# Patient Record
Sex: FEMALE
[De-identification: names, ages, dates, MRNs, and addresses within clinical notes are randomized per-mention and may not be internally consistent; named-entity substitution may affect disease eponyms.]

---

## 2017-04-25 ENCOUNTER — APPOINTMENT (OUTPATIENT)
Dept: CARDIOLOGY | Facility: CLINIC | Age: 77
End: 2017-04-25

## 2017-12-04 ENCOUNTER — MEDICATION RENEWAL (OUTPATIENT)
Age: 77
End: 2017-12-04

## 2018-03-02 ENCOUNTER — MEDICATION RENEWAL (OUTPATIENT)
Age: 78
End: 2018-03-02

## 2018-03-06 ENCOUNTER — CLINICAL ADVICE (OUTPATIENT)
Age: 78
End: 2018-03-06

## 2018-04-24 ENCOUNTER — APPOINTMENT (OUTPATIENT)
Dept: CARDIOLOGY | Facility: CLINIC | Age: 78
End: 2018-04-24

## 2018-05-14 ENCOUNTER — RECORD ABSTRACTING (OUTPATIENT)
Age: 78
End: 2018-05-14

## 2018-05-15 ENCOUNTER — APPOINTMENT (OUTPATIENT)
Dept: CARDIOLOGY | Facility: CLINIC | Age: 78
End: 2018-05-15
Payer: MEDICARE

## 2018-05-15 VITALS
DIASTOLIC BLOOD PRESSURE: 84 MMHG | WEIGHT: 168 LBS | HEIGHT: 66 IN | OXYGEN SATURATION: 97 % | SYSTOLIC BLOOD PRESSURE: 116 MMHG | BODY MASS INDEX: 27 KG/M2 | HEART RATE: 58 BPM

## 2018-05-15 DIAGNOSIS — Z83.3 FAMILY HISTORY OF DIABETES MELLITUS: ICD-10-CM

## 2018-05-15 DIAGNOSIS — Z84.89 FAMILY HISTORY OF OTHER SPECIFIED CONDITIONS: ICD-10-CM

## 2018-05-15 DIAGNOSIS — Z84.1 FAMILY HISTORY OF DISORDERS OF KIDNEY AND URETER: ICD-10-CM

## 2018-05-15 DIAGNOSIS — Z80.1 FAMILY HISTORY OF MALIGNANT NEOPLASM OF TRACHEA, BRONCHUS AND LUNG: ICD-10-CM

## 2018-05-15 DIAGNOSIS — Z86.79 PERSONAL HISTORY OF OTHER DISEASES OF THE CIRCULATORY SYSTEM: ICD-10-CM

## 2018-05-15 DIAGNOSIS — Z78.9 OTHER SPECIFIED HEALTH STATUS: ICD-10-CM

## 2018-05-15 PROCEDURE — 93000 ELECTROCARDIOGRAM COMPLETE: CPT

## 2018-05-15 PROCEDURE — 99214 OFFICE O/P EST MOD 30 MIN: CPT

## 2018-12-04 ENCOUNTER — APPOINTMENT (OUTPATIENT)
Dept: CARDIOLOGY | Facility: CLINIC | Age: 78
End: 2018-12-04
Payer: MEDICARE

## 2018-12-04 VITALS
SYSTOLIC BLOOD PRESSURE: 130 MMHG | BODY MASS INDEX: 26.2 KG/M2 | HEART RATE: 58 BPM | OXYGEN SATURATION: 96 % | HEIGHT: 66 IN | WEIGHT: 163 LBS | DIASTOLIC BLOOD PRESSURE: 76 MMHG

## 2018-12-04 PROCEDURE — 99214 OFFICE O/P EST MOD 30 MIN: CPT

## 2018-12-04 NOTE — ASSESSMENT
[FreeTextEntry1] : - Hypertension.  Continue to keep blood pressure log and this was reminded again. Nonpharmacologic ways of reducing blood pressure again discussed.\par \par - Hypercholesteremia.  She is again declining statins. The implication of calculated 10 yr Cardiovascular risk was discussed. Risk and benefits of statins was discussed. Dietary changes were again discussed in detail.\par \par - Continue anticoagulant.\par \par - She is requesting renewal of  her  Toprol.\par \par - Patient is fairly active. She swims regularly.  No dyspnea on exertion. Previous echocardiogram in 2016 had moderate diastolic dysfunction But no reported pulmonary hypertension. Followup echocardiogram in the future.\par \par Sections of this transcription were entered using Voice Recognition Software. There may be an error with phonetically similar sounding words. Please call me if there are any questions re the content.\par \par \par Sincerely,\par Wilbur Carty MD, FACC, [place] \par

## 2018-12-04 NOTE — HISTORY OF PRESENT ILLNESS
[FreeTextEntry1] : Rose is a pleasant 77-year-old female with   paroxysmal atrial fibrillation - on beta blockers and anticoagulation with good ventricular rate control and Asymptomatic for it.\par \par She has hypertension for which she takes lisinopril and beta blockers.  Her blood pressure today is controlled.  \par \par No chest pains, No palpitations or dizziness. No TIA.  Older Stress echogram was negative for ischemia. \par \par Calculated 10 year cardiovascular risk >20%. They still refused statins. On recent labs, LDL was 119.\par

## 2018-12-04 NOTE — PHYSICAL EXAM
[General Appearance - Well Developed] : well developed [Normal Appearance] : normal appearance [Well Groomed] : well groomed [General Appearance - Well Nourished] : well nourished [No Deformities] : no deformities [General Appearance - In No Acute Distress] : no acute distress [Normal Conjunctiva] : the conjunctiva exhibited no abnormalities [Eyelids - No Xanthelasma] : the eyelids demonstrated no xanthelasmas [Normal Oral Mucosa] : normal oral mucosa [No Oral Pallor] : no oral pallor [No Oral Cyanosis] : no oral cyanosis [Respiration, Rhythm And Depth] : normal respiratory rhythm and effort [Exaggerated Use Of Accessory Muscles For Inspiration] : no accessory muscle use [Auscultation Breath Sounds / Voice Sounds] : lungs were clear to auscultation bilaterally [Heart Rate And Rhythm] : heart rate and rhythm were normal [Heart Sounds] : normal S1 and S2 [Murmurs] : no murmurs present [Abdomen Soft] : soft [Abdomen Tenderness] : non-tender [Abnormal Walk] : normal gait [Gait - Sufficient For Exercise Testing] : the gait was sufficient for exercise testing [Nail Clubbing] : no clubbing of the fingernails [Cyanosis, Localized] : no localized cyanosis [FreeTextEntry1] : Superficial varicose veins on the ankles. [Skin Color & Pigmentation] : normal skin color and pigmentation [Skin Turgor] : normal skin turgor [] : no rash [Oriented To Time, Place, And Person] : oriented to person, place, and time [Affect] : the affect was normal [Mood] : the mood was normal [No Anxiety] : not feeling anxious

## 2019-07-09 ENCOUNTER — NON-APPOINTMENT (OUTPATIENT)
Age: 79
End: 2019-07-09

## 2019-07-09 ENCOUNTER — APPOINTMENT (OUTPATIENT)
Dept: CARDIOLOGY | Facility: CLINIC | Age: 79
End: 2019-07-09
Payer: MEDICARE

## 2019-07-09 VITALS
SYSTOLIC BLOOD PRESSURE: 150 MMHG | BODY MASS INDEX: 26.84 KG/M2 | OXYGEN SATURATION: 96 % | HEIGHT: 66 IN | HEART RATE: 64 BPM | DIASTOLIC BLOOD PRESSURE: 80 MMHG | WEIGHT: 167 LBS

## 2019-07-09 PROCEDURE — 93000 ELECTROCARDIOGRAM COMPLETE: CPT

## 2019-07-09 PROCEDURE — 99214 OFFICE O/P EST MOD 30 MIN: CPT

## 2019-07-09 NOTE — PHYSICAL EXAM
[General Appearance - Well Developed] : well developed [Normal Appearance] : normal appearance [General Appearance - Well Nourished] : well nourished [Well Groomed] : well groomed [No Deformities] : no deformities [General Appearance - In No Acute Distress] : no acute distress [Normal Conjunctiva] : the conjunctiva exhibited no abnormalities [Normal Oral Mucosa] : normal oral mucosa [Eyelids - No Xanthelasma] : the eyelids demonstrated no xanthelasmas [No Oral Pallor] : no oral pallor [No Oral Cyanosis] : no oral cyanosis [Exaggerated Use Of Accessory Muscles For Inspiration] : no accessory muscle use [Respiration, Rhythm And Depth] : normal respiratory rhythm and effort [Heart Rate And Rhythm] : heart rate and rhythm were normal [Auscultation Breath Sounds / Voice Sounds] : lungs were clear to auscultation bilaterally [Murmurs] : no murmurs present [Heart Sounds] : normal S1 and S2 [Abdomen Soft] : soft [Abnormal Walk] : normal gait [Abdomen Tenderness] : non-tender [Gait - Sufficient For Exercise Testing] : the gait was sufficient for exercise testing [Nail Clubbing] : no clubbing of the fingernails [Cyanosis, Localized] : no localized cyanosis [Skin Turgor] : normal skin turgor [Skin Color & Pigmentation] : normal skin color and pigmentation [FreeTextEntry1] : Superficial varicose veins on the ankles. [Oriented To Time, Place, And Person] : oriented to person, place, and time [] : no rash [Mood] : the mood was normal [Affect] : the affect was normal [No Anxiety] : not feeling anxious

## 2019-07-09 NOTE — HISTORY OF PRESENT ILLNESS
[FreeTextEntry1] : Rose is a pleasant 78-year-old female with   paroxysmal atrial fibrillation - on beta blockers and anticoagulation with good ventricular rate control and Asymptomatic for it.\par \par She has hypertension for which she takes lisinopril and beta blockers.  Her blood pressure today is controlled.  \par \par No chest pains, No palpitations or dizziness. No TIA.  Older Stress echogram was negative for ischemia. \par \par Calculated 10 year cardiovascular risk >20%. They still refused statins. On recent labs, LDL was 144.\par

## 2019-07-09 NOTE — ASSESSMENT
[FreeTextEntry1] : - Hypertension.  Continue to keep blood pressure log and this was reminded again. Nonpharmacologic ways of reducing blood pressure again discussed. Blood pressure is high today.  She is refusing increase in medications\par \par - Hypercholesteremia.  She is again declining statins. The implication of calculated 10 yr Cardiovascular risk was discussed. Risk and benefits of statins was discussed. Dietary changes were again discussed in detail. Her LDL has increased in January. The patient is aware of this\par \par - Continue anticoagulant.\par \par - Patient is fairly active. She swims regularly.  No dyspnea on exertion. Previous echocardiogram in 2016 had moderate diastolic dysfunction But no reported pulmonary hypertension. Followup echocardiogram in the future. Patient wants to defer this.\par \par She also refused to get weight today\par \par \par Sincerely,\par Wilbur Carty MD, FACC, [place] \par

## 2019-11-14 ENCOUNTER — MEDICATION RENEWAL (OUTPATIENT)
Age: 79
End: 2019-11-14

## 2020-02-25 ENCOUNTER — APPOINTMENT (OUTPATIENT)
Dept: CARDIOLOGY | Facility: CLINIC | Age: 80
End: 2020-02-25
Payer: MEDICARE

## 2020-02-25 VITALS
SYSTOLIC BLOOD PRESSURE: 176 MMHG | HEART RATE: 57 BPM | BODY MASS INDEX: 26.68 KG/M2 | HEIGHT: 66 IN | DIASTOLIC BLOOD PRESSURE: 84 MMHG | WEIGHT: 166 LBS | OXYGEN SATURATION: 97 %

## 2020-02-25 PROCEDURE — 99214 OFFICE O/P EST MOD 30 MIN: CPT

## 2020-02-25 NOTE — ASSESSMENT
[FreeTextEntry1] : - Hypertension.  Continue to keep blood pressure log.  She has not brought this even though she has been reminded several times.  Blood pressure is uncontrolled and labile she is refusing increase in medications\par \par - Hypercholesteremia.  She is again declining statins. The implication of calculated 10 yr Cardiovascular risk was discussed. Risk and benefits of statins was discussed. Dietary changes were again discussed in detail. Her LDL has increased in January. The patient is aware of this\par \par - Continue anticoagulant.\par \par - Patient is fairly active. She swims regularly.  No dyspnea on exertion. Previous echocardiogram in 2016 had moderate diastolic dysfunction Followup echocardiogram in the future. Patient wants to defer this.\par \par She also refused to get weight today\par \par ETT in future for blood pressure control as well as for CAD evaluation\par \par \par Sincerely,\par Wilbur Carty MD, FACC, FASE \par

## 2020-02-25 NOTE — PHYSICAL EXAM
[General Appearance - Well Developed] : well developed [Normal Appearance] : normal appearance [Well Groomed] : well groomed [General Appearance - Well Nourished] : well nourished [No Deformities] : no deformities [General Appearance - In No Acute Distress] : no acute distress [Normal Conjunctiva] : the conjunctiva exhibited no abnormalities [Eyelids - No Xanthelasma] : the eyelids demonstrated no xanthelasmas [No Oral Cyanosis] : no oral cyanosis [Normal Oral Mucosa] : normal oral mucosa [No Oral Pallor] : no oral pallor [Respiration, Rhythm And Depth] : normal respiratory rhythm and effort [Exaggerated Use Of Accessory Muscles For Inspiration] : no accessory muscle use [Auscultation Breath Sounds / Voice Sounds] : lungs were clear to auscultation bilaterally [Heart Sounds] : normal S1 and S2 [Heart Rate And Rhythm] : heart rate and rhythm were normal [Abdomen Soft] : soft [Murmurs] : no murmurs present [Abdomen Tenderness] : non-tender [Abnormal Walk] : normal gait [Gait - Sufficient For Exercise Testing] : the gait was sufficient for exercise testing [Cyanosis, Localized] : no localized cyanosis [Nail Clubbing] : no clubbing of the fingernails [FreeTextEntry1] : Superficial varicose veins on the ankles. [Skin Color & Pigmentation] : normal skin color and pigmentation [Skin Turgor] : normal skin turgor [] : no rash [Oriented To Time, Place, And Person] : oriented to person, place, and time [Affect] : the affect was normal [No Anxiety] : not feeling anxious [Mood] : the mood was normal

## 2020-02-25 NOTE — HISTORY OF PRESENT ILLNESS
[FreeTextEntry1] : Rose is a pleasant 79-year-old female with   paroxysmal atrial fibrillation - on beta blockers and anticoagulation with good rate control and Asymptomatic for it.\par \par She has hypertension for which she takes lisinopril and beta blockers.  Her blood pressure today is uncontrolled.  It has been labile in the past.  She does not want to increase her blood pressure medications.  She insists that the blood pressure is well controlled outside this office.  He is asymptomatic for hypertension\par \par No chest pains, No palpitations or dizziness. No TIA.  Older Stress echogram was negative for ischemia. \par \par Calculated 10 year cardiovascular risk >20%. They still refused statins. On previous labs, LDL was 144.  Has come down to 129\par

## 2020-09-08 ENCOUNTER — APPOINTMENT (OUTPATIENT)
Dept: CARDIOLOGY | Facility: CLINIC | Age: 80
End: 2020-09-08
Payer: MEDICARE

## 2020-09-08 PROCEDURE — 93306 TTE W/DOPPLER COMPLETE: CPT

## 2020-09-08 PROCEDURE — 93015 CV STRESS TEST SUPVJ I&R: CPT

## 2021-02-16 ENCOUNTER — APPOINTMENT (OUTPATIENT)
Dept: CARDIOLOGY | Facility: CLINIC | Age: 81
End: 2021-02-16
Payer: MEDICARE

## 2021-02-16 VITALS
OXYGEN SATURATION: 98 % | TEMPERATURE: 98.2 F | BODY MASS INDEX: 26.68 KG/M2 | DIASTOLIC BLOOD PRESSURE: 98 MMHG | HEART RATE: 56 BPM | HEIGHT: 66 IN | SYSTOLIC BLOOD PRESSURE: 200 MMHG | WEIGHT: 166 LBS

## 2021-02-16 PROCEDURE — 99214 OFFICE O/P EST MOD 30 MIN: CPT

## 2021-02-16 RX ORDER — LISINOPRIL 20 MG/1
20 TABLET ORAL DAILY
Qty: 90 | Refills: 3 | Status: DISCONTINUED | COMMUNITY
Start: 1900-01-01 | End: 2021-02-16

## 2021-02-16 NOTE — ASSESSMENT
[FreeTextEntry1] : - Hypertension.  Continue to keep blood pressure log.  She has not brought this even though she has been reminded several times.  Blood pressure is uncontrolled and labile.  Heart rate is low.  Keep beta-blockers as it is.  Change lisinopril to valsartan.  She will call next week with blood pressure reading.  She does not want to make another office visit.\par \par - Hypercholesteremia.  She is again declining statins. The implication of calculated 10 yr Cardiovascular risk was discussed. Risk and benefits of statins was discussed. Dietary changes were again discussed in detail. Her LDL has increased in January. The patient is aware of this.  I have reminded the patient to have fasting lipid profile with primary care.\par \par - Continue anticoagulant.  No   episodes of palpitations, TIA, dizziness\par \par - Patient is fairly active. She used to swim regularly.  This has stopped due to Covid situation.  No dyspnea on exertion. \par \par - ETT in September 2020 : Exercised 6: 07 minutes on Shadi protocol.  Peak blood pressure 198.  She reached 85% of target heart rate.  No ischemia or angina.\par \par -Echocardiogram September 2020: Normal LV function and wall motion. Mildly dilated left atrium.  Normal diastolic function and PASP.\par \par Follow-up in 6 months.  Follow-up earlier if her home blood pressure log shows elevated blood pressure readings.  The patient is agreeable to this plan.\par \par \par Sincerely,\par Wilbur Carty MD, FACC, [place] \par

## 2021-02-16 NOTE — HISTORY OF PRESENT ILLNESS
[FreeTextEntry1] : Rose is a pleasant 80-year-old female with   paroxysmal atrial fibrillation - on beta blockers and anticoagulation with good rate control and Asymptomatic for it.\par \par She has hypertension for which she takes lisinopril and beta blockers.  Her blood pressure today is uncontrolled.  It has been labile in the past.  Se is asymptomatic for hypertension.  Her pressure was also elevated with ETT.  She has finally agreed to increase her antihypertensive treatment\par \par No chest pains, No palpitations or dizziness. No TIA.  Older Stress echogram was negative for ischemia. \par \par Calculated 10 year cardiovascular risk >20%. They still refused statins. On previous labs, LDL was 144.  Has come down to 129\par

## 2021-08-31 ENCOUNTER — APPOINTMENT (OUTPATIENT)
Dept: CARDIOLOGY | Facility: CLINIC | Age: 81
End: 2021-08-31
Payer: MEDICARE

## 2021-08-31 VITALS
HEART RATE: 60 BPM | OXYGEN SATURATION: 97 % | DIASTOLIC BLOOD PRESSURE: 86 MMHG | WEIGHT: 166 LBS | SYSTOLIC BLOOD PRESSURE: 164 MMHG | BODY MASS INDEX: 26.68 KG/M2 | HEIGHT: 66 IN

## 2021-08-31 PROCEDURE — 93000 ELECTROCARDIOGRAM COMPLETE: CPT

## 2021-08-31 PROCEDURE — 99214 OFFICE O/P EST MOD 30 MIN: CPT

## 2021-08-31 RX ORDER — VALSARTAN 160 MG/1
160 TABLET, COATED ORAL DAILY
Qty: 90 | Refills: 0 | Status: DISCONTINUED | COMMUNITY
Start: 2021-02-16 | End: 2021-08-31

## 2021-08-31 NOTE — PHYSICAL EXAM
[General Appearance - Well Developed] : well developed [Normal Appearance] : normal appearance [Well Groomed] : well groomed [General Appearance - Well Nourished] : well nourished [No Deformities] : no deformities [General Appearance - In No Acute Distress] : no acute distress [Normal Conjunctiva] : the conjunctiva exhibited no abnormalities [Eyelids - No Xanthelasma] : the eyelids demonstrated no xanthelasmas [Normal Oral Mucosa] : normal oral mucosa [No Oral Pallor] : no oral pallor [No Oral Cyanosis] : no oral cyanosis [Respiration, Rhythm And Depth] : normal respiratory rhythm and effort [Exaggerated Use Of Accessory Muscles For Inspiration] : no accessory muscle use [Auscultation Breath Sounds / Voice Sounds] : lungs were clear to auscultation bilaterally [Heart Rate And Rhythm] : heart rate and rhythm were normal [Heart Sounds] : normal S1 and S2 [Murmurs] : no murmurs present [Abdomen Soft] : soft [Abdomen Tenderness] : non-tender [Abnormal Walk] : normal gait [Gait - Sufficient For Exercise Testing] : the gait was sufficient for exercise testing [Nail Clubbing] : no clubbing of the fingernails [FreeTextEntry1] : Superficial varicose veins on the ankles. [Cyanosis, Localized] : no localized cyanosis [Skin Color & Pigmentation] : normal skin color and pigmentation [Skin Turgor] : normal skin turgor [] : no rash [Oriented To Time, Place, And Person] : oriented to person, place, and time [Affect] : the affect was normal [Mood] : the mood was normal [No Anxiety] : not feeling anxious

## 2021-08-31 NOTE — ASSESSMENT
[FreeTextEntry1] : - Hypertension.  Continue to keep blood pressure log.  She has not brought this even though she has been reminded several times.  Blood pressure is uncontrolled and labile.  Beta-blocker dose was reduced in the past due to bradycardia.  Currently on valsartan and HCTZ.  Add amlodipine 2.5 p.o. daily.  Check BMP and magnesium.  Follow-up blood pressure in few weeks\par \par - Hypercholesteremia.  She is again declining statins. The implication of calculated 10 yr Cardiovascular risk was discussed. Risk and benefits of statins was discussed. Dietary changes were again discussed in detail. Her LDL had increased in January 2021. The patient is aware of this.  I have reminded the patient to have fasting lipid profile with primary care.\par \par - Continue anticoagulant.  No   episodes of palpitations, TIA, dizziness.  GI bleed.  No bleeding elsewhere.\par \par - Patient is fairly active.  He has restarted swimming.  No dyspnea on exertion. \par \par - ETT in September 2020 : Exercised only 6: 07 minutes on Shadi protocol.  Peak blood pressure 198.  She reached 85% of target heart rate.  No ischemia or angina.\par \par -Echocardiogram September 2020: Normal LV function and wall motion. Mildly dilated left atrium.  Normal diastolic function and PASP.\par \par \par \par \par Sincerely,\par Wilbur Carty MD, FACC, [place] \par

## 2021-09-21 ENCOUNTER — APPOINTMENT (OUTPATIENT)
Dept: CARDIOLOGY | Facility: CLINIC | Age: 81
End: 2021-09-21
Payer: MEDICARE

## 2021-09-21 VITALS — DIASTOLIC BLOOD PRESSURE: 86 MMHG | SYSTOLIC BLOOD PRESSURE: 168 MMHG

## 2021-09-21 VITALS
HEART RATE: 71 BPM | WEIGHT: 165 LBS | TEMPERATURE: 97.7 F | DIASTOLIC BLOOD PRESSURE: 80 MMHG | OXYGEN SATURATION: 96 % | SYSTOLIC BLOOD PRESSURE: 182 MMHG | HEIGHT: 66 IN | BODY MASS INDEX: 26.52 KG/M2

## 2021-09-21 PROCEDURE — 99213 OFFICE O/P EST LOW 20 MIN: CPT

## 2021-09-21 NOTE — ASSESSMENT
[FreeTextEntry1] : To review, Rose is a 80-year-old female with the following active issues.\par \par Hypertension: Not well controlled.  She did not start amlodipine as requested at her previous office visit.  Extensive discussion regarding need for blood pressure control.  Goal less than 130 systolic.  Patient verbalizes understanding.  She states she will start amlodipine but prefers to have her blood pressure checked at her PMDs office.  Requested note be sent to us to ensure normotension.\par \par PAF: Tolerating Xarelto.  No abnormal bleeding.  Stable H&H.  Prescription refilled.

## 2021-09-21 NOTE — REASON FOR VISIT
[Hypertension] : hypertension [FreeTextEntry1] : Rose is a very pleasant 80-year-old female that presents today for blood pressure reevaluation.  At last office visit, blood pressure was uncontrolled.  Amlodipine 2.5 mg was added to her regimen.  She presents today for follow-up.  Unfortunately she was not aware that she was supposed to start a new medication and did not pick it up.  Blood pressure my evaluation was 168/86 mmHg.  States that overall she feels well.  Denies any exertional symptoms to include chest pain or shortness of breath.  Denies headache, or FND.\par \par History of PAF.  She is requesting renewal of her Xarelto.  CBC shows normal hemoglobin and hematocrit.

## 2022-03-29 ENCOUNTER — APPOINTMENT (OUTPATIENT)
Dept: CARDIOLOGY | Facility: CLINIC | Age: 82
End: 2022-03-29
Payer: MEDICARE

## 2022-03-29 VITALS
TEMPERATURE: 97.1 F | HEIGHT: 66 IN | WEIGHT: 165 LBS | BODY MASS INDEX: 26.52 KG/M2 | RESPIRATION RATE: 14 BRPM | DIASTOLIC BLOOD PRESSURE: 76 MMHG | HEART RATE: 62 BPM | OXYGEN SATURATION: 100 % | SYSTOLIC BLOOD PRESSURE: 154 MMHG

## 2022-03-29 DIAGNOSIS — K21.9 GASTRO-ESOPHAGEAL REFLUX DISEASE W/OUT ESOPHAGITIS: ICD-10-CM

## 2022-03-29 DIAGNOSIS — I10 ESSENTIAL (PRIMARY) HYPERTENSION: ICD-10-CM

## 2022-03-29 DIAGNOSIS — E78.00 PURE HYPERCHOLESTEROLEMIA, UNSPECIFIED: ICD-10-CM

## 2022-03-29 DIAGNOSIS — I48.0 PAROXYSMAL ATRIAL FIBRILLATION: ICD-10-CM

## 2022-03-29 PROCEDURE — 99214 OFFICE O/P EST MOD 30 MIN: CPT

## 2022-03-29 NOTE — REASON FOR VISIT
[Hypertension] : hypertension [FreeTextEntry1] : Rose is a very pleasant 81-year-old female with hypertension.  At last office visit, blood pressure was uncontrolled.  Amlodipine 2.5 mg was added to her regimen.  States that overall she feels well.  Denies any exertional symptoms to include chest pain or shortness of breath.  Denies headache, or FND.  Blood pressure is elevated in the office today.  She is taking her medications regularly including amlodipine.\par \par History of PAF.  She is requesting renewal of her Xarelto.  CBC shows normal hemoglobin and hematocrit.

## 2022-03-29 NOTE — ASSESSMENT
[FreeTextEntry1] : To review, Rose is a 81-year-old female with the following active issues.\par \par Hypertension: Not well controlled.  She takes amlodipine 2.5 mg daily.  She is compliant with other blood pressure medications but did not take them this morning.  \par \par Extensive discussion regarding need for blood pressure control.  Goal less than 130 systolic.  Patient verbalizes understanding.  \par \par PAF: Tolerating Xarelto.  No abnormal bleeding.  Stable H&H.  Prescription refilled.\par \par Carotid atherosclerosis: Check carotid ultrasound also echocardiogram for possible hypertensive changes.  Keep blood pressure log at home.\par \par

## 2022-03-29 NOTE — PHYSICAL EXAM
[Well Developed] : well developed [Well Nourished] : well nourished [No Acute Distress] : no acute distress [Normal Conjunctiva] : normal conjunctiva [Normal] : normal venous pressure, no carotid bruit [No Carotid Bruit] : no carotid bruit [Normal S1, S2] : normal S1, S2 [No Murmur] : no murmur [No Rub] : no rub [No Gallop] : no gallop [Clear Lung Fields] : clear lung fields [Good Air Entry] : good air entry [No Respiratory Distress] : no respiratory distress  [Soft] : abdomen soft [Non Tender] : non-tender [No Masses/organomegaly] : no masses/organomegaly [Normal Bowel Sounds] : normal bowel sounds [Normal Gait] : normal gait [No Edema] : no edema [No Cyanosis] : no cyanosis [No Clubbing] : no clubbing [No Varicosities] : no varicosities [No Rash] : no rash [No Skin Lesions] : no skin lesions [Normal Speech] : normal speech [Alert and Oriented] : alert and oriented [Normal memory] : normal memory [de-identified] : Unequal carotid  stroke

## 2022-10-03 ENCOUNTER — APPOINTMENT (OUTPATIENT)
Dept: CARDIOLOGY | Facility: CLINIC | Age: 82
End: 2022-10-03

## 2022-10-03 PROCEDURE — 93306 TTE W/DOPPLER COMPLETE: CPT

## 2022-10-03 PROCEDURE — 93880 EXTRACRANIAL BILAT STUDY: CPT

## 2022-10-06 ENCOUNTER — NON-APPOINTMENT (OUTPATIENT)
Age: 82
End: 2022-10-06

## 2022-10-11 ENCOUNTER — APPOINTMENT (OUTPATIENT)
Dept: CARDIOLOGY | Facility: CLINIC | Age: 82
End: 2022-10-11

## 2023-08-18 ENCOUNTER — APPOINTMENT (OUTPATIENT)
Dept: CARDIOLOGY | Facility: CLINIC | Age: 83
End: 2023-08-18
Payer: MEDICARE

## 2023-08-18 VITALS
HEIGHT: 66 IN | OXYGEN SATURATION: 98 % | BODY MASS INDEX: 26.68 KG/M2 | SYSTOLIC BLOOD PRESSURE: 144 MMHG | HEART RATE: 74 BPM | WEIGHT: 166 LBS | DIASTOLIC BLOOD PRESSURE: 60 MMHG

## 2023-08-18 PROCEDURE — 93000 ELECTROCARDIOGRAM COMPLETE: CPT

## 2023-08-18 PROCEDURE — 99213 OFFICE O/P EST LOW 20 MIN: CPT

## 2023-08-18 RX ORDER — AMLODIPINE BESYLATE 5 MG/1
5 TABLET ORAL
Qty: 90 | Refills: 3 | Status: ACTIVE | COMMUNITY
Start: 2021-08-31 | End: 1900-01-01

## 2023-08-24 NOTE — DISCUSSION/SUMMARY
[FreeTextEntry1] : JOSSY CARSON is a 82 year old F who presents today Aug 22, 2023 with the above history and the following active issues: Hyperlipidemia Pt refuses to have blood repeated or to go on Statins. She will discuss it with her primary care MD.She will also follow up with her and have CBC drawn Discussed red flag symptoms, which would warrant sooner or emergent medical evaluation. Any questions and concerns were addressed and resolved.

## 2023-08-24 NOTE — HISTORY OF PRESENT ILLNESS
[FreeTextEntry1] : Rose is a very pleasant 81-year-old female with hypertension. She has not followed up in over a year. Is here to get refills. Her BP at home is 115//64. She feels well. She is taking all her medications. Her last lipid panel 10/22 Chol 226  HDL 75. She states that she had blood work done at her PMD At last office visit, blood pressure was uncontrolled.  Amlodipine 2.5 mg was added to her regimen.  States that overall she feels well.  Denies any exertional symptoms to include chest pain or shortness of breath.  Denies headache, or FND.  Blood pressure is elevated in the office today.  She is taking her medications regularly including amlodipine.  History of PAF.  She is requesting renewal of her Xarelto.  CBC shows normal hemoglobin and hematocrit.

## 2023-08-24 NOTE — REASON FOR VISIT
[FreeTextEntry1] : Rose is a very pleasant 82year-old female with hypertension. She has not followed up in over a year. Is here to get refills. Her BP at home is 115//64. She feels well. She is taking all her medications. Her last lipid panel 10/22 Chol 226  HDL 75. She states that she had blood work done at her PMD At last office visit, blood pressure was uncontrolled.  Amlodipine 2.5 mg was added to her regimen.  States that overall she feels well.  Denies any exertional symptoms to include chest pain or shortness of breath.  Denies headache, or FND.  Blood pressure is elevated in the office today.  She is taking her medications regularly including amlodipine.  History of PAF.  She is requesting renewal of her Xarelto.  CBC shows normal hemoglobin and hematocrit.

## 2024-02-26 RX ORDER — RIVAROXABAN 15 MG/1
15 TABLET, FILM COATED ORAL
Qty: 90 | Refills: 0 | Status: ACTIVE | COMMUNITY
Start: 1900-01-01 | End: 1900-01-01

## 2024-02-26 RX ORDER — VALSARTAN AND HYDROCHLOROTHIAZIDE 160; 25 MG/1; MG/1
160-25 TABLET, FILM COATED ORAL
Qty: 90 | Refills: 0 | Status: ACTIVE | COMMUNITY
Start: 2021-03-11 | End: 1900-01-01

## 2024-05-28 ENCOUNTER — RX RENEWAL (OUTPATIENT)
Age: 84
End: 2024-05-28

## 2024-05-28 RX ORDER — METOPROLOL SUCCINATE 25 MG/1
25 TABLET, EXTENDED RELEASE ORAL
Qty: 45 | Refills: 0 | Status: ACTIVE | COMMUNITY
Start: 2024-05-28 | End: 1900-01-01

## 2024-07-23 ENCOUNTER — APPOINTMENT (OUTPATIENT)
Dept: CARDIOLOGY | Facility: CLINIC | Age: 84
End: 2024-07-23
Payer: MEDICARE

## 2024-07-23 VITALS
OXYGEN SATURATION: 96 % | HEART RATE: 64 BPM | SYSTOLIC BLOOD PRESSURE: 144 MMHG | WEIGHT: 164 LBS | DIASTOLIC BLOOD PRESSURE: 68 MMHG | BODY MASS INDEX: 26.47 KG/M2

## 2024-07-23 DIAGNOSIS — E78.00 PURE HYPERCHOLESTEROLEMIA, UNSPECIFIED: ICD-10-CM

## 2024-07-23 DIAGNOSIS — R73.03 PREDIABETES.: ICD-10-CM

## 2024-07-23 DIAGNOSIS — K21.9 GASTRO-ESOPHAGEAL REFLUX DISEASE W/OUT ESOPHAGITIS: ICD-10-CM

## 2024-07-23 DIAGNOSIS — I10 ESSENTIAL (PRIMARY) HYPERTENSION: ICD-10-CM

## 2024-07-23 DIAGNOSIS — I48.0 PAROXYSMAL ATRIAL FIBRILLATION: ICD-10-CM

## 2024-07-23 PROCEDURE — G2211 COMPLEX E/M VISIT ADD ON: CPT

## 2024-07-23 PROCEDURE — 99214 OFFICE O/P EST MOD 30 MIN: CPT

## 2024-07-23 RX ORDER — ROSUVASTATIN CALCIUM 5 MG/1
5 TABLET, FILM COATED ORAL DAILY
Qty: 90 | Refills: 3 | Status: ACTIVE | COMMUNITY
Start: 1900-01-01 | End: 1900-01-01

## 2024-07-23 NOTE — PHYSICAL EXAM
[Well Developed] : well developed [Well Nourished] : well nourished [No Acute Distress] : no acute distress [Normal Conjunctiva] : normal conjunctiva [Normal Venous Pressure] : normal venous pressure [No Carotid Bruit] : no carotid bruit [Normal S1, S2] : normal S1, S2 [No Murmur] : no murmur [No Rub] : no rub [No Gallop] : no gallop [Clear Lung Fields] : clear lung fields [Good Air Entry] : good air entry [No Respiratory Distress] : no respiratory distress  [Soft] : abdomen soft [Normal Gait] : normal gait [No Edema] : no edema [No Cyanosis] : no cyanosis [No Clubbing] : no clubbing [No Varicosities] : no varicosities [Moves all extremities] : moves all extremities [No Focal Deficits] : no focal deficits [Normal Speech] : normal speech [Alert and Oriented] : alert and oriented [Normal memory] : normal memory

## 2024-07-23 NOTE — REASON FOR VISIT
[FreeTextEntry1] : Rose is a very pleasant 83 year-old female with hypertension.  Blood pressure at home is well-controlled.  She is taking all her medications. Amlodipine was added during last OV for uncontrolled blood pressure.  They are still not ideally controlled.  States that overall she feels well.  Denies any exertional symptoms to include chest pain or shortness of breath.  Denies headache, or FND.  Blood pressure is elevated in the office today.  No cardiovascular events such as chest pain, syncope, TIA, etc. in the past year.  History of PAF.  On  Xarelto.  CBC shows normal hemoglobin and hematocrit in October 2022.  Labs September 2023: Creatinine 1.1.  Normal LFT.  .  HDL 82.  Normal TSH.  A1c 5.8.  Her LDL increased significantly in the September 2023 labs.

## 2024-07-23 NOTE — DISCUSSION/SUMMARY
[FreeTextEntry1] : JOSSY CARSON is a 83 year old F  Hyperlipidemia: LDL has increased significantly in September 2023 labs.  She takes low-dose rosuvastatin.  She admits to liberal intake of butter and cheese.  She does not want to increase rosuvastatin.   She wants to renew all her meds.  This will require labs  Uncontrolled hypertension.  She insist that her pressures are well-controlled at home and does not want to change medications.  On low-dose Xarelto.  No bleeding complications.  No TIA.  History of PAF.  Discussed red flag symptoms, which would warrant sooner or emergent medical evaluation. Any questions and concerns were addressed and resolved.  Thank you for this referral and allowing me to participate in the care of this patient.  If I can be of any further help or  if you have any questions, please do not hesitate to contact me   Sincerely,  Wilbur Carty MD, [facility], [place]

## 2025-01-02 ENCOUNTER — APPOINTMENT (OUTPATIENT)
Dept: OPHTHALMOLOGY | Facility: CLINIC | Age: 85
End: 2025-01-02
Payer: MEDICARE

## 2025-01-02 ENCOUNTER — NON-APPOINTMENT (OUTPATIENT)
Age: 85
End: 2025-01-02

## 2025-01-02 PROCEDURE — 92004 COMPRE OPH EXAM NEW PT 1/>: CPT

## 2025-01-15 ENCOUNTER — APPOINTMENT (OUTPATIENT)
Dept: OPHTHALMOLOGY | Facility: CLINIC | Age: 85
End: 2025-01-15
Payer: MEDICARE

## 2025-01-15 ENCOUNTER — NON-APPOINTMENT (OUTPATIENT)
Age: 85
End: 2025-01-15

## 2025-01-15 PROCEDURE — 92136 OPHTHALMIC BIOMETRY: CPT

## 2025-01-15 PROCEDURE — 99213 OFFICE O/P EST LOW 20 MIN: CPT

## 2025-02-12 ENCOUNTER — APPOINTMENT (OUTPATIENT)
Dept: OPHTHALMOLOGY | Facility: AMBULATORY MEDICAL SERVICES | Age: 85
End: 2025-02-12
Payer: MEDICARE

## 2025-02-12 ENCOUNTER — NON-APPOINTMENT (OUTPATIENT)
Age: 85
End: 2025-02-12

## 2025-02-12 PROCEDURE — 66984 XCAPSL CTRC RMVL W/O ECP: CPT | Mod: RT

## 2025-02-13 ENCOUNTER — APPOINTMENT (OUTPATIENT)
Dept: OPHTHALMOLOGY | Facility: CLINIC | Age: 85
End: 2025-02-13
Payer: MEDICARE

## 2025-02-13 ENCOUNTER — NON-APPOINTMENT (OUTPATIENT)
Age: 85
End: 2025-02-13

## 2025-02-13 PROCEDURE — 99024 POSTOP FOLLOW-UP VISIT: CPT

## 2025-02-20 ENCOUNTER — NON-APPOINTMENT (OUTPATIENT)
Age: 85
End: 2025-02-20

## 2025-02-20 ENCOUNTER — APPOINTMENT (OUTPATIENT)
Dept: OPHTHALMOLOGY | Facility: CLINIC | Age: 85
End: 2025-02-20
Payer: MEDICARE

## 2025-02-20 PROCEDURE — 92136 OPHTHALMIC BIOMETRY: CPT | Mod: 26,LT

## 2025-02-20 PROCEDURE — 92012 INTRM OPH EXAM EST PATIENT: CPT | Mod: 24

## 2025-03-12 ENCOUNTER — NON-APPOINTMENT (OUTPATIENT)
Age: 85
End: 2025-03-12

## 2025-03-12 ENCOUNTER — APPOINTMENT (OUTPATIENT)
Dept: OPHTHALMOLOGY | Facility: AMBULATORY MEDICAL SERVICES | Age: 85
End: 2025-03-12
Payer: MEDICARE

## 2025-03-12 PROCEDURE — 66984 XCAPSL CTRC RMVL W/O ECP: CPT | Mod: 79,LT

## 2025-03-13 ENCOUNTER — APPOINTMENT (OUTPATIENT)
Dept: OPHTHALMOLOGY | Facility: CLINIC | Age: 85
End: 2025-03-13
Payer: MEDICARE

## 2025-03-13 ENCOUNTER — NON-APPOINTMENT (OUTPATIENT)
Age: 85
End: 2025-03-13

## 2025-03-13 PROCEDURE — 99024 POSTOP FOLLOW-UP VISIT: CPT

## 2025-03-20 ENCOUNTER — APPOINTMENT (OUTPATIENT)
Dept: OPHTHALMOLOGY | Facility: CLINIC | Age: 85
End: 2025-03-20
Payer: MEDICARE

## 2025-03-20 ENCOUNTER — NON-APPOINTMENT (OUTPATIENT)
Age: 85
End: 2025-03-20

## 2025-03-20 PROCEDURE — 99024 POSTOP FOLLOW-UP VISIT: CPT

## 2025-04-17 ENCOUNTER — APPOINTMENT (OUTPATIENT)
Dept: OPHTHALMOLOGY | Facility: CLINIC | Age: 85
End: 2025-04-17
Payer: MEDICARE

## 2025-04-17 ENCOUNTER — NON-APPOINTMENT (OUTPATIENT)
Age: 85
End: 2025-04-17

## 2025-04-17 PROCEDURE — 99024 POSTOP FOLLOW-UP VISIT: CPT

## 2025-07-22 ENCOUNTER — APPOINTMENT (OUTPATIENT)
Dept: CARDIOLOGY | Facility: CLINIC | Age: 85
End: 2025-07-22
Payer: MEDICARE

## 2025-07-22 VITALS
OXYGEN SATURATION: 98 % | RESPIRATION RATE: 14 BRPM | HEIGHT: 66 IN | HEART RATE: 53 BPM | DIASTOLIC BLOOD PRESSURE: 60 MMHG | SYSTOLIC BLOOD PRESSURE: 116 MMHG | BODY MASS INDEX: 26.03 KG/M2 | WEIGHT: 162 LBS

## 2025-07-22 DIAGNOSIS — E78.00 PURE HYPERCHOLESTEROLEMIA, UNSPECIFIED: ICD-10-CM

## 2025-07-22 DIAGNOSIS — R73.03 PREDIABETES.: ICD-10-CM

## 2025-07-22 DIAGNOSIS — I10 ESSENTIAL (PRIMARY) HYPERTENSION: ICD-10-CM

## 2025-07-22 DIAGNOSIS — I48.0 PAROXYSMAL ATRIAL FIBRILLATION: ICD-10-CM

## 2025-07-22 DIAGNOSIS — K21.9 GASTRO-ESOPHAGEAL REFLUX DISEASE W/OUT ESOPHAGITIS: ICD-10-CM

## 2025-07-22 PROCEDURE — 93000 ELECTROCARDIOGRAM COMPLETE: CPT

## 2025-07-22 PROCEDURE — 99214 OFFICE O/P EST MOD 30 MIN: CPT
